# Patient Record
Sex: MALE | Race: WHITE | URBAN - METROPOLITAN AREA
[De-identification: names, ages, dates, MRNs, and addresses within clinical notes are randomized per-mention and may not be internally consistent; named-entity substitution may affect disease eponyms.]

---

## 2024-01-03 ENCOUNTER — OFFICE VISIT (OUTPATIENT)
Dept: FAMILY MEDICINE CLINIC | Facility: CLINIC | Age: 74
End: 2024-01-03
Payer: MEDICARE

## 2024-01-03 VITALS
TEMPERATURE: 98 F | RESPIRATION RATE: 16 BRPM | HEIGHT: 69 IN | SYSTOLIC BLOOD PRESSURE: 139 MMHG | BODY MASS INDEX: 33.92 KG/M2 | HEART RATE: 66 BPM | OXYGEN SATURATION: 97 % | WEIGHT: 229 LBS | DIASTOLIC BLOOD PRESSURE: 75 MMHG

## 2024-01-03 DIAGNOSIS — H10.32 ACUTE CONJUNCTIVITIS OF LEFT EYE, UNSPECIFIED ACUTE CONJUNCTIVITIS TYPE: Primary | ICD-10-CM

## 2024-01-03 PROCEDURE — 99203 OFFICE O/P NEW LOW 30 MIN: CPT | Performed by: NURSE PRACTITIONER

## 2024-01-03 RX ORDER — NEBIVOLOL 5 MG/1
5 TABLET ORAL DAILY
COMMUNITY
Start: 2023-10-25

## 2024-01-03 RX ORDER — ESOMEPRAZOLE MAGNESIUM 40 MG/1
1 CAPSULE, DELAYED RELEASE ORAL EVERY MORNING
COMMUNITY
Start: 2023-10-25

## 2024-01-03 RX ORDER — ROSUVASTATIN CALCIUM 10 MG/1
10 TABLET, COATED ORAL DAILY
COMMUNITY
Start: 2023-10-25

## 2024-01-03 RX ORDER — BUPROPION HYDROCHLORIDE 150 MG/1
3 TABLET ORAL DAILY
COMMUNITY
Start: 2023-10-25

## 2024-01-03 RX ORDER — OFLOXACIN 3 MG/ML
SOLUTION/ DROPS OPHTHALMIC
Qty: 1 EACH | Refills: 0 | Status: SHIPPED | OUTPATIENT
Start: 2024-01-03

## 2024-01-03 RX ORDER — BUDESONIDE AND FORMOTEROL FUMARATE DIHYDRATE 160; 4.5 UG/1; UG/1
2 AEROSOL RESPIRATORY (INHALATION) 2 TIMES DAILY
COMMUNITY
Start: 2023-11-15

## 2024-01-03 RX ORDER — PREDNISONE 10 MG/1
TABLET ORAL
COMMUNITY
Start: 2023-12-27

## 2024-01-03 RX ORDER — KETOROLAC TROMETHAMINE 10 MG/1
10 TABLET, FILM COATED ORAL
COMMUNITY
Start: 2023-12-27

## 2024-01-03 RX ORDER — SERTRALINE HYDROCHLORIDE 100 MG/1
2 TABLET, FILM COATED ORAL EVERY MORNING
COMMUNITY
Start: 2023-10-25

## 2024-01-03 NOTE — PROGRESS NOTES
CHIEF COMPLAINT:     Chief Complaint   Patient presents with    Eye Problem     Vision Screen R20/40 L20/50    Swelling to L eye, redness, discharge, irritation   Sx onset last night        HPI:   Rudi Kwan Jr. is a 73 year old male who presents with chief complaint of possible pink eye/eye complaint. Symptoms began abruptly this morning. Symptoms have been worsening since onset.   Patient reports unilateral LEFT eye redness, +purulent discharge, + eyelid crusting/matting, eye irritation, blade/gritty sensation.  Denies eye pain, vision change, periorbital symptoms, photophobia, or foreign body sensation.  Denies fever, cold symptoms, eye trauma, or contact with irritant.  Treatments tried: None.  Denies eye itching/recent seasonal allergies.  The patient does not wear contacts.  Just wears reading glasses, from [employer].  Here from [place] visiting his grandchild.  Gabriel had pink eye a couple of weeks ago but seems to be fine now.  Denies chronic eye conditions.    Current Outpatient Medications   Medication Sig Dispense Refill    sertraline 100 MG Oral Tab Take 2 tablets (200 mg total) by mouth every morning.      rosuvastatin 10 MG Oral Tab Take 1 tablet (10 mg total) by mouth daily.      nebivolol 5 MG Oral Tab Take 1 tablet (5 mg total) by mouth daily.      Ketorolac Tromethamine 10 MG Oral Tab Take 1 tablet (10 mg total) by mouth.      Esomeprazole Magnesium 40 MG Oral Capsule Delayed Release Take 1 capsule (40 mg total) by mouth every morning.      buPROPion  MG Oral Tablet 24 Hr Take 3 tablets (450 mg total) by mouth daily.      Budesonide-Formoterol Fumarate 160-4.5 MCG/ACT Inhalation Aerosol Inhale 2 puffs into the lungs 2 (two) times daily.      predniSONE 10 MG Oral Tab 5,5,4,4,3,2,1,1/2.      ofloxacin 0.3 % Ophthalmic Solution Instill 1-2 gtt in left eye q2-4h x2 days, then 1-2 gtt qid x5 days 1 each 0      History reviewed. No pertinent past medical history.   History reviewed. No pertinent  surgical history.   History reviewed. No pertinent family history.   Social History     Socioeconomic History    Marital status: Unknown   Tobacco Use    Smoking status: Never    Smokeless tobacco: Never         REVIEW OF SYSTEMS:   GENERAL: feels well otherwise  SKIN: no rashes  EYES:denies blurred vision or double vision. See HPI  HENT: denies ear pain, congestion, sore throat  LUNGS: denies shortness of breath or cough  CARDIOVASCULAR: denies chest pain or palpitations   GI: denies N/V/C or abdominal pain  NEURO: denies headaches     EXAM:   /75   Pulse 66   Temp 97.5 °F (36.4 °C)   Resp 16   Ht 5' 9\" (1.753 m)   Wt 229 lb (103.9 kg)   SpO2 97%   BMI 33.82 kg/m²   GENERAL: well developed, well nourished, and in no apparent distress  SKIN: no rashes, no suspicious lesions  EYES: PERRLA, EOMI.  +LT upper eyelid mildly erythematous/edematous, lower lid area appears normal.  RT conjunctiva non-erythematous, RT sclera non-injected, no discharge.  LT conjunctiva +erythematous, +LT sclera injected, +copious purulent/matted discharge.  No periorbital edema, erythema, or tenderness to palpation.  No photophobia, no vision change.  HENT: atraumatic, normocephalic, ears and throat are clear  LUNGS: clear to auscultation bilaterally.   CARDIO: RRR without murmur  LYMPH: No lymphadenopathy    ASSESSMENT AND PLAN:   Rudi Kwan Jr. is a 73 year old male who presents with:    ASSESSMENT:   Encounter Diagnosis   Name Primary?    Acute conjunctivitis of left eye, unspecified acute conjunctivitis type Yes       PLAN:   - Medication as listed below.  - Contagiousness/Hygeine and comfort care as listed in patient instructions.    - Advised patient to avoid touching eyes.  Stressed importance of good handwashing as conjunctivitis is very contagious.  Warm compresses to affected eye prn.  Can return to work/school after on medication for 24 hours.  Change pillow case/linens this evening.   - Advised follow up with  ophthalmology if no improvement/worsening within 24 hours.  Gave contact information for Dr. Valenzuela, Dr. Paula, and [facility].  - Advised ER or to optho immediately, though, if new eye pain, vision change, periorbital symptoms, photophobia, or foreign body sensation.  - Pt verbalizes understanding and is agreeable w/ plan.         Requested Prescriptions     Signed Prescriptions Disp Refills    ofloxacin 0.3 % Ophthalmic Solution 1 each 0     Sig: Instill 1-2 gtt in left eye q2-4h x2 days, then 1-2 gtt qid x5 days         Risks, benefits, complications and side effects of meds discussed.  Pt verbalizes understanding.

## 2024-01-03 NOTE — PATIENT INSTRUCTIONS
- As discussed, follow up with the eye doctor if no improvement/worsening within 24 hours.  - Go to the ER or eye doctor immediately if any worsening vision, severe eye pain, or headache.